# Patient Record
Sex: FEMALE | Race: BLACK OR AFRICAN AMERICAN | URBAN - METROPOLITAN AREA
[De-identification: names, ages, dates, MRNs, and addresses within clinical notes are randomized per-mention and may not be internally consistent; named-entity substitution may affect disease eponyms.]

---

## 2019-07-07 ENCOUNTER — OFFICE VISIT (OUTPATIENT)
Dept: FAMILY MEDICINE CLINIC | Facility: CLINIC | Age: 69
End: 2019-07-07
Payer: MEDICARE

## 2019-07-07 DIAGNOSIS — Z11.1 SCREENING-PULMONARY TB: Primary | ICD-10-CM

## 2019-07-07 PROCEDURE — 86580 TB INTRADERMAL TEST: CPT | Performed by: NURSE PRACTITIONER

## 2019-07-07 NOTE — PATIENT INSTRUCTIONS
You will need to return to clinic in 48-72 hours to have results of TB test read. Please return to clinic on 7/9/2019 after 12:10 or on 7/10/2019 before 12:10 to have your TB test read.     If you do not return during this time your test will need to be

## 2019-07-07 NOTE — PROGRESS NOTES
Patient here for TB screening only      Mary Boucher is a 79year old female who presents for TB testing. TUBERCULOSIS SCREENING QUESTIONNAIRE    · Live vaccines in the past month? no  · Any steroid medication in the past month?  no  · History of B

## 2019-07-09 ENCOUNTER — OFFICE VISIT (OUTPATIENT)
Dept: FAMILY MEDICINE CLINIC | Facility: CLINIC | Age: 69
End: 2019-07-09

## 2019-07-09 DIAGNOSIS — Z11.1 ENCOUNTER FOR PPD SKIN TEST READING: Primary | ICD-10-CM

## 2019-07-09 NOTE — PROGRESS NOTES
Pt here for reading of TST done on Sunday [id number]. Area without induration. Negative skin tet noted.

## (undated) NOTE — LETTER
July 9, 2019    Gaurang Blue      Dear Kimberley Gray: The following are the results of your recent tests. Please review the list of test results.   Your result is the value on the left; we have also supplied the